# Patient Record
Sex: MALE | Race: WHITE | URBAN - METROPOLITAN AREA
[De-identification: names, ages, dates, MRNs, and addresses within clinical notes are randomized per-mention and may not be internally consistent; named-entity substitution may affect disease eponyms.]

---

## 2021-01-18 ENCOUNTER — IMPORTED ENCOUNTER (OUTPATIENT)
Dept: URBAN - METROPOLITAN AREA CLINIC 9 | Facility: CLINIC | Age: 36
End: 2021-01-18

## 2021-01-22 ENCOUNTER — IMPORTED ENCOUNTER (OUTPATIENT)
Dept: URBAN - METROPOLITAN AREA CLINIC 9 | Facility: CLINIC | Age: 36
End: 2021-01-22

## 2021-01-29 ENCOUNTER — IMPORTED ENCOUNTER (OUTPATIENT)
Dept: URBAN - METROPOLITAN AREA CLINIC 9 | Facility: CLINIC | Age: 36
End: 2021-01-29

## 2021-02-05 ENCOUNTER — IMPORTED ENCOUNTER (OUTPATIENT)
Dept: URBAN - METROPOLITAN AREA CLINIC 9 | Facility: CLINIC | Age: 36
End: 2021-02-05

## 2021-02-25 NOTE — PATIENT DISCUSSION
pt wants sx to be rid of glasses; advised pt that sx after RK is very unpredictable and not the best option. Discussed scleral lenses with pt.

## 2021-03-05 ENCOUNTER — IMPORTED ENCOUNTER (OUTPATIENT)
Dept: URBAN - METROPOLITAN AREA CLINIC 9 | Facility: CLINIC | Age: 36
End: 2021-03-05

## 2021-04-06 NOTE — PATIENT DISCUSSION
Cataract(s) are not yet visually significant to the patient. Recommend monitoring, and patient agrees with this plan. Slurring of speech, headache and nape of neck pain, onset 7.30 am

## 2021-06-04 ENCOUNTER — IMPORTED ENCOUNTER (OUTPATIENT)
Dept: URBAN - METROPOLITAN AREA CLINIC 9 | Facility: CLINIC | Age: 36
End: 2021-06-04

## 2021-10-16 ASSESSMENT — TONOMETRY
OS_IOP_MMHG: 19
OD_IOP_MMHG: 11
OD_IOP_MMHG: 11
OD_IOP_MMHG: 15
OS_IOP_MMHG: 14
OD_IOP_MMHG: 12
OS_IOP_MMHG: 12
OS_IOP_MMHG: 14
OS_IOP_MMHG: 13
OS_IOP_MMHG: 16
OD_IOP_MMHG: 13
OD_IOP_MMHG: 12

## 2021-10-16 ASSESSMENT — VISUAL ACUITY
OD_CC: 20/25 -2 SN
OS_CC: 20/20 -2 SN
OD_CC: 20/25 -2 SN
OS_CC: 20/20 SN
OS_CC: 20/20 SN
OD_CC: 20/40 SN
OD_CC: 20/25 SN
OS_CC: 20/20 SN
OS_CC: 20/20 - SN
OS_CC: 20/20 SN
OD_CC: 20/30 -2 SN
OD_CC: 20/20 -3 SN

## 2021-12-14 NOTE — PATIENT DISCUSSION
pt wants sx to be rid of glasses; advised pt that sx after RK is very unpredictable. Discussed scleral lenses with pt.

## 2021-12-14 NOTE — PATIENT DISCUSSION
Advised pt that most likely she will not be able to get out of glasses secondary to previous RK. Recommend Basic Cataract surgery.

## 2022-02-21 NOTE — PATIENT DISCUSSION
Discussed condition and exacerbating conditions/situations (e.g., dry/arid environments, overhead fans, air conditioners, side effect of medications). Provider E-Visit time total (minutes): 5